# Patient Record
Sex: MALE | Race: WHITE | NOT HISPANIC OR LATINO | Employment: UNEMPLOYED | URBAN - METROPOLITAN AREA
[De-identification: names, ages, dates, MRNs, and addresses within clinical notes are randomized per-mention and may not be internally consistent; named-entity substitution may affect disease eponyms.]

---

## 2023-06-05 ENCOUNTER — TELEPHONE (OUTPATIENT)
Dept: PRIMARY CARE | Facility: CLINIC | Age: 38
End: 2023-06-05
Payer: COMMERCIAL

## 2023-06-05 DIAGNOSIS — G47.26 SHIFT WORK SLEEP DISORDER: ICD-10-CM

## 2023-06-05 RX ORDER — MODAFINIL 100 MG/1
100 TABLET ORAL DAILY
Qty: 90 TABLET | Refills: 0 | Status: SHIPPED | OUTPATIENT
Start: 2023-06-05 | End: 2023-08-29 | Stop reason: SDUPTHER

## 2023-06-05 RX ORDER — MODAFINIL 100 MG/1
100 TABLET ORAL DAILY
COMMUNITY
Start: 2023-05-05 | End: 2023-06-05 | Stop reason: SDUPTHER

## 2023-06-05 NOTE — TELEPHONE ENCOUNTER
Rx Refill Request Telephone Encounter    Name:  Jason Brito  :  [id number]  Medication Name: Modafinil 100 MG Oral Tablet             Specific Pharmacy location:   [address]       Date of last appointment: 22   Date of next appointment: Declined scheduling an appointment at this time.    Best number to reach patient: [phone]

## 2023-08-29 ENCOUNTER — TELEPHONE (OUTPATIENT)
Dept: PRIMARY CARE | Facility: CLINIC | Age: 38
End: 2023-08-29
Payer: COMMERCIAL

## 2023-08-29 DIAGNOSIS — G47.26 SHIFT WORK SLEEP DISORDER: ICD-10-CM

## 2023-08-29 NOTE — TELEPHONE ENCOUNTER
Rx Refill Request Telephone Encounter    Name:  Jason Brito  :  [id number]  Medication Name:  Modafinil  100 mg  oral  daily  90    Specific Pharmacy location:  [facility]  Date of last appointment:  Dec 2022  Date of next appointment:    Best number to reach patient:  [phone]

## 2023-08-31 RX ORDER — MODAFINIL 100 MG/1
100 TABLET ORAL DAILY
Qty: 90 TABLET | Refills: 0 | Status: SHIPPED | OUTPATIENT
Start: 2023-08-31 | End: 2023-09-08 | Stop reason: SDUPTHER

## 2023-09-08 ENCOUNTER — OFFICE VISIT (OUTPATIENT)
Dept: PRIMARY CARE | Facility: CLINIC | Age: 38
End: 2023-09-08
Payer: COMMERCIAL

## 2023-09-08 VITALS
WEIGHT: 161.8 LBS | HEART RATE: 62 BPM | TEMPERATURE: 97.8 F | DIASTOLIC BLOOD PRESSURE: 80 MMHG | OXYGEN SATURATION: 95 % | HEIGHT: 69 IN | SYSTOLIC BLOOD PRESSURE: 115 MMHG | BODY MASS INDEX: 23.96 KG/M2

## 2023-09-08 DIAGNOSIS — G47.26 SHIFT WORK SLEEP DISORDER: ICD-10-CM

## 2023-09-08 DIAGNOSIS — Z79.899 DRUG THERAPY: ICD-10-CM

## 2023-09-08 DIAGNOSIS — Z13.29 THYROID DISORDER SCREENING: ICD-10-CM

## 2023-09-08 DIAGNOSIS — Z13.220 SCREENING, LIPID: Primary | ICD-10-CM

## 2023-09-08 PROCEDURE — 80307 DRUG TEST PRSMV CHEM ANLYZR: CPT

## 2023-09-08 PROCEDURE — 99213 OFFICE O/P EST LOW 20 MIN: CPT | Performed by: NURSE PRACTITIONER

## 2023-09-08 RX ORDER — MODAFINIL 100 MG/1
100 TABLET ORAL DAILY
Qty: 90 TABLET | Refills: 3 | Status: SHIPPED | OUTPATIENT
Start: 2023-09-08 | End: 2024-02-05 | Stop reason: WASHOUT

## 2023-09-08 ASSESSMENT — ENCOUNTER SYMPTOMS
NERVOUS/ANXIOUS: 0
FEVER: 0
PALPITATIONS: 0
VOMITING: 0
DIZZINESS: 0
HEADACHES: 0
APNEA: 0
COUGH: 0
CONSTIPATION: 0
ABDOMINAL PAIN: 0
NAUSEA: 0
CONFUSION: 0
CONSTITUTIONAL NEGATIVE: 1
WHEEZING: 0
ACTIVITY CHANGE: 0
DIARRHEA: 0
CHILLS: 0
RESPIRATORY NEGATIVE: 1
WEAKNESS: 0

## 2023-09-08 NOTE — PROGRESS NOTES
"Subjective   Patient ID: Jason Brito is a 37 y.o. male who presents for Follow-up (6 month follow up ).    Routine visit.  Follow-up shift sleep disorder.  On Provigil  No issues or concerns noted on today         Review of Systems   Constitutional: Negative.  Negative for activity change, chills and fever.   Respiratory: Negative.  Negative for apnea, cough and wheezing.    Cardiovascular:  Negative for chest pain and palpitations.   Gastrointestinal:  Negative for abdominal pain, constipation, diarrhea, nausea and vomiting.   Neurological:  Negative for dizziness, weakness and headaches.   Psychiatric/Behavioral:  Negative for confusion. The patient is not nervous/anxious.        Objective   /80 (BP Location: Left arm, Patient Position: Sitting)   Pulse 62   Temp 36.6 °C (97.8 °F)   Ht 1.753 m (5' 9\")   Wt 73.4 kg (161 lb 12.8 oz)   SpO2 95%   BMI 23.89 kg/m²     Physical Exam  Vitals reviewed.   Constitutional:       Appearance: Normal appearance.   Cardiovascular:      Rate and Rhythm: Normal rate and regular rhythm.      Pulses: Normal pulses.      Heart sounds: Normal heart sounds.   Pulmonary:      Effort: Pulmonary effort is normal.      Breath sounds: Normal breath sounds.   Neurological:      Mental Status: He is alert and oriented to person, place, and time.   Psychiatric:         Mood and Affect: Mood normal.         Behavior: Behavior normal.         Assessment/Plan   Problem List Items Addressed This Visit       Shift work sleep disorder     Med refilled   Uds and contract done.   Doing well         Relevant Medications    modafinil (Provigil) 100 mg tablet    Other Relevant Orders    CBC    Comprehensive Metabolic Panel    Vitamin D 1,25 Dihydroxy (for eval of hypercalcemia)     Other Visit Diagnoses       Screening, lipid    -  Primary    Relevant Orders    Lipid Panel    Thyroid disorder screening        Relevant Orders    TSH with reflex to Free T4 if abnormal    Drug therapy        " Relevant Orders    Drug Screen, Urine With Reflex to Confirmation (Completed)            Time Spent  Time spent directly with patient, family or caregiver: 20 minutes  Other Time Spent: 10 minutes

## 2023-09-09 LAB
AMPHETAMINE (PRESENCE) IN URINE BY SCREEN METHOD: NORMAL
BARBITURATES PRESENCE IN URINE BY SCREEN METHOD: NORMAL
BENZODIAZEPINE (PRESENCE) IN URINE BY SCREEN METHOD: NORMAL
CANNABINOIDS IN URINE BY SCREEN METHOD: NORMAL
COCAINE (PRESENCE) IN URINE BY SCREEN METHOD: NORMAL
DRUG SCREEN COMMENT URINE: NORMAL
FENTANYL URINE: NORMAL
OPIATES (PRESENCE) IN URINE BY SCREEN METHOD: NORMAL
OXYCODONE (PRESENCE) IN URINE BY SCREEN METHOD: NORMAL
PHENCYCLIDINE (PRESENCE) IN URINE BY SCREEN METHOD: NORMAL

## 2023-10-23 ENCOUNTER — HOSPITAL ENCOUNTER (EMERGENCY)
Facility: HOSPITAL | Age: 38
Discharge: HOME | End: 2023-10-23
Payer: COMMERCIAL

## 2023-10-23 ENCOUNTER — APPOINTMENT (OUTPATIENT)
Dept: RADIOLOGY | Facility: HOSPITAL | Age: 38
End: 2023-10-23
Payer: COMMERCIAL

## 2023-10-23 VITALS
DIASTOLIC BLOOD PRESSURE: 70 MMHG | RESPIRATION RATE: 18 BRPM | BODY MASS INDEX: 25.01 KG/M2 | HEIGHT: 68 IN | TEMPERATURE: 98.7 F | OXYGEN SATURATION: 100 % | SYSTOLIC BLOOD PRESSURE: 112 MMHG | WEIGHT: 165 LBS | HEART RATE: 98 BPM

## 2023-10-23 DIAGNOSIS — M77.8 TENDINITIS OF RIGHT TRICEPS: Primary | ICD-10-CM

## 2023-10-23 PROCEDURE — 73080 X-RAY EXAM OF ELBOW: CPT | Mod: RT,FY

## 2023-10-23 PROCEDURE — 73080 X-RAY EXAM OF ELBOW: CPT | Mod: RIGHT SIDE | Performed by: RADIOLOGY

## 2023-10-23 PROCEDURE — 99283 EMERGENCY DEPT VISIT LOW MDM: CPT | Mod: 25

## 2023-10-23 RX ORDER — MELOXICAM 15 MG/1
15 TABLET ORAL DAILY
Qty: 10 TABLET | Refills: 0 | Status: SHIPPED | OUTPATIENT
Start: 2023-10-23 | End: 2024-02-05 | Stop reason: WASHOUT

## 2023-10-23 ASSESSMENT — LIFESTYLE VARIABLES
REASON UNABLE TO ASSESS: NO
EVER HAD A DRINK FIRST THING IN THE MORNING TO STEADY YOUR NERVES TO GET RID OF A HANGOVER: NO
EVER FELT BAD OR GUILTY ABOUT YOUR DRINKING: NO
HAVE PEOPLE ANNOYED YOU BY CRITICIZING YOUR DRINKING: NO
HAVE YOU EVER FELT YOU SHOULD CUT DOWN ON YOUR DRINKING: NO

## 2023-10-23 ASSESSMENT — PAIN DESCRIPTION - LOCATION: LOCATION: ELBOW

## 2023-10-23 ASSESSMENT — PAIN SCALES - GENERAL: PAINLEVEL_OUTOF10: 6

## 2023-10-23 ASSESSMENT — PAIN - FUNCTIONAL ASSESSMENT: PAIN_FUNCTIONAL_ASSESSMENT: 0-10

## 2023-10-23 ASSESSMENT — PAIN DESCRIPTION - PAIN TYPE: TYPE: ACUTE PAIN

## 2023-10-23 ASSESSMENT — PAIN DESCRIPTION - ORIENTATION: ORIENTATION: RIGHT

## 2023-10-23 NOTE — ED PROVIDER NOTES
Chief Complaint   Patient presents with    right elbow pain       HPI       37 year old right hand dominant male presents to the Emergency Department today complaining of a 1 week history of right elbow pain status post injury. Notes that he injured it while catching a limb from a tree. Notes that the pain is mainly in the triceps region and occurs only with certain movements. Denies any associated fever, chills, headache, neck pain, chest pain, shortness of breath, abdominal pain, nausea, vomiting, diarrhea, constipation, or urinary symptoms.       History provided by:  Patient             Patient History   No past medical history on file.  Past Surgical History:   Procedure Laterality Date    OTHER SURGICAL HISTORY  06/18/2020    [place] tooth extraction     No family history on file.  Social History     Tobacco Use    Smoking status: Every Day     Types: Cigarettes    Smokeless tobacco: Never   Vaping Use    Vaping Use: Never used   Substance Use Topics    Alcohol use: Yes     Comment: rarely    Drug use: Never           Physical Exam  Constitutional:       General: He is awake.      Appearance: Normal appearance.   Cardiovascular:      Rate and Rhythm: Normal rate and regular rhythm.      Pulses:           Radial pulses are 3+ on the right side and 3+ on the left side.      Heart sounds: Normal heart sounds. No murmur heard.     No friction rub. No gallop.   Pulmonary:      Effort: Pulmonary effort is normal.      Breath sounds: Normal breath sounds and air entry.   Musculoskeletal:        Arms:       Comments: No obvious deformity or ecchymosis noted to the right elbow region, but there is a small amount of edema and tenderness noted over the triceps tendon. No redness, warmth, erythema, discharge, drainage, or any other signs of infection. Full ROM. Right radial pulse is strong and regular. Capillary refill was within normal limits. Sensation is intact distally.    Neurological:      Mental Status: He is alert.    Psychiatric:         Behavior: Behavior is cooperative.         Labs Reviewed - No data to display    XR elbow right T 3+ views   Final Result   No acute elbow fracture or dislocation.   Negative for elbow joint effusion.   Mild posterior elbow soft tissue swelling and small osteophyte arising   from the olecranon.   Signed by Magnus Da Silva MD               ED Course & MDM   Diagnoses as of 10/23/23 0433   Tendinitis of right triceps           Medical Decision Making  Patient was seen and evaluated by myself. X-rays of the right elbow show osteophytes, but no further pathology. There are no signs of cellulitis or septic joint. Instructed to ice and elevate the sore area as much as possible. Given a prescription for Meloxicam. No contraindications to NSAIDs are noted.  Given orthopedics for follow up. Return if worse in any way. Discharged in stable condition with computer instructions.     Diagnostic Impression:    1. Acute right tendinitis    2. Prescription therapy            Your medication list        ASK your doctor about these medications        Instructions Last Dose Given Next Dose Due   modafinil 100 mg tablet  Commonly known as: Provigil      Take 1 tablet (100 mg) by mouth once daily. as directed                  Procedure  Procedures     YRN Perry-GRAYSON  10/23/23 0524       YRN Perry-GRAYSON  10/23/23 0816

## 2023-12-31 ENCOUNTER — PHARMACY VISIT (OUTPATIENT)
Dept: PHARMACY | Facility: CLINIC | Age: 38
End: 2023-12-31
Payer: MEDICAID

## 2023-12-31 ENCOUNTER — APPOINTMENT (OUTPATIENT)
Dept: RADIOLOGY | Facility: HOSPITAL | Age: 38
End: 2023-12-31
Payer: COMMERCIAL

## 2023-12-31 ENCOUNTER — HOSPITAL ENCOUNTER (EMERGENCY)
Facility: HOSPITAL | Age: 38
Discharge: HOME | End: 2023-12-31
Attending: EMERGENCY MEDICINE
Payer: COMMERCIAL

## 2023-12-31 VITALS
OXYGEN SATURATION: 98 % | HEIGHT: 68 IN | BODY MASS INDEX: 24.25 KG/M2 | DIASTOLIC BLOOD PRESSURE: 82 MMHG | TEMPERATURE: 98.3 F | RESPIRATION RATE: 20 BRPM | HEART RATE: 83 BPM | SYSTOLIC BLOOD PRESSURE: 132 MMHG | WEIGHT: 160 LBS

## 2023-12-31 DIAGNOSIS — R05.1 ACUTE COUGH: Primary | ICD-10-CM

## 2023-12-31 LAB
FLUAV RNA RESP QL NAA+PROBE: NOT DETECTED
FLUBV RNA RESP QL NAA+PROBE: NOT DETECTED
RSV RNA RESP QL NAA+PROBE: NOT DETECTED
SARS-COV-2 RNA RESP QL NAA+PROBE: NOT DETECTED

## 2023-12-31 PROCEDURE — 71045 X-RAY EXAM CHEST 1 VIEW: CPT | Performed by: RADIOLOGY

## 2023-12-31 PROCEDURE — 99283 EMERGENCY DEPT VISIT LOW MDM: CPT | Performed by: EMERGENCY MEDICINE

## 2023-12-31 PROCEDURE — 87637 SARSCOV2&INF A&B&RSV AMP PRB: CPT | Performed by: EMERGENCY MEDICINE

## 2023-12-31 PROCEDURE — RXMED WILLOW AMBULATORY MEDICATION CHARGE

## 2023-12-31 PROCEDURE — 71045 X-RAY EXAM CHEST 1 VIEW: CPT

## 2023-12-31 RX ORDER — ALBUTEROL SULFATE 90 UG/1
2 AEROSOL, METERED RESPIRATORY (INHALATION) EVERY 4 HOURS PRN
Qty: 18 G | Refills: 0 | Status: SHIPPED | OUTPATIENT
Start: 2023-12-31 | End: 2023-12-31 | Stop reason: SDUPTHER

## 2023-12-31 RX ORDER — BENZONATATE 100 MG/1
100 CAPSULE ORAL EVERY 8 HOURS
Qty: 21 CAPSULE | Refills: 0 | Status: SHIPPED | OUTPATIENT
Start: 2023-12-31 | End: 2024-01-07

## 2023-12-31 RX ORDER — ALBUTEROL SULFATE 90 UG/1
2 AEROSOL, METERED RESPIRATORY (INHALATION) EVERY 4 HOURS PRN
Qty: 18 G | Refills: 0 | Status: SHIPPED | OUTPATIENT
Start: 2023-12-31 | End: 2024-02-05 | Stop reason: WASHOUT

## 2023-12-31 RX ORDER — BENZONATATE 100 MG/1
100 CAPSULE ORAL EVERY 8 HOURS
Qty: 21 CAPSULE | Refills: 0 | Status: SHIPPED | OUTPATIENT
Start: 2023-12-31 | End: 2023-12-31 | Stop reason: SDUPTHER

## 2023-12-31 ASSESSMENT — PAIN SCALES - GENERAL: PAINLEVEL_OUTOF10: 1

## 2023-12-31 ASSESSMENT — PAIN - FUNCTIONAL ASSESSMENT: PAIN_FUNCTIONAL_ASSESSMENT: 0-10

## 2023-12-31 NOTE — ED PROVIDER NOTES
HPI   Chief Complaint   Patient presents with   • Cough     X 4 weeks       Patient is a smoker.  Presents for department for weeks of coughing.  Felt often better last week but then got worse again.  Endorses his young son is now coughing as well.  Patient has any sick contacts.  Patient works in a [employer] company.  He denies any known sick contacts.  Patient has any fevers.  Patient states the cough is worse in the morning and associated with phlegm.  Patient has no phlegm for the rest the day and the cough is sometimes better or worse with cannot track any reasons why.  Patient continues to smoke.                          [place] Coma Scale Score: 15                  Patient History   No past medical history on file.  Past Surgical History:   Procedure Laterality Date   • OTHER SURGICAL HISTORY  06/18/2020    [place] tooth extraction     No family history on file.  Social History     Tobacco Use   • Smoking status: Every Day     Types: Cigarettes   • Smokeless tobacco: Never   Vaping Use   • Vaping Use: Never used   Substance Use Topics   • Alcohol use: Yes     Comment: rarely   • Drug use: Never       Physical Exam   ED Triage Vitals [12/31/23 1104]   Temp Heart Rate Resp BP   36.8 °C (98.3 °F) 83 20 132/82      SpO2 Temp src Heart Rate Source Patient Position   98 % -- Monitor Standing      BP Location FiO2 (%)     Left arm --       Physical Exam  Vitals and nursing note reviewed.   Constitutional:       General: He is not in acute distress.     Appearance: He is well-developed.   HENT:      Head: Normocephalic and atraumatic.      Right Ear: External ear normal.      Left Ear: External ear normal.      Mouth/Throat:      Mouth: Mucous membranes are moist.      Pharynx: Oropharynx is clear.   Eyes:      Extraocular Movements: Extraocular movements intact.      Conjunctiva/sclera: Conjunctivae normal.   Neck:      Trachea: Trachea normal.   Cardiovascular:      Rate and Rhythm: Normal rate.    Pulmonary:      Effort: Pulmonary effort is normal. No respiratory distress.      Breath sounds: Normal breath sounds.   Abdominal:      General: Bowel sounds are normal.      Palpations: Abdomen is soft.      Tenderness: There is no abdominal tenderness.   Musculoskeletal:         General: No swelling or tenderness.      Cervical back: No tenderness.   Skin:     General: Skin is warm and dry.      Capillary Refill: Capillary refill takes less than 2 seconds.   Neurological:      General: No focal deficit present.      Mental Status: He is alert and oriented to person, place, and time.   Psychiatric:         Mood and Affect: Mood normal.         Thought Content: Thought content does not include homicidal or suicidal ideation.         ED Course & MDM   Diagnoses as of 12/31/23 1256   Acute cough       Medical Decision Making  Patient presents with cough for 3 weeks.  Available chart reviewed. On initial assessment the patient was found non-toxic, no acute distress, vitals hemodynamically stable and afebrile. Initial concern for viral infection, pneumonia, pneumothorax, bronchitis.  Cough worse in the morning with phlegm seems like postnasal drip.  RSV, COVID, flu testing is all negative. Chest x-ray is read as negative.  Will prescribe albuterol inhaler and give prescription of Tessalon Perles.  Encouraged outpatient follow-up.    No indication for admission.  Discussed findings and diagnosis with the patient, follow-up and return to ED precautions given, patient voiced understanding, agrees with plan, questions answered, patient was discharged in stable condition.        Procedure  Procedures     Parveen Stratton MD  12/31/23 3360

## 2024-01-25 ENCOUNTER — APPOINTMENT (OUTPATIENT)
Dept: PRIMARY CARE | Facility: CLINIC | Age: 39
End: 2024-01-25
Payer: COMMERCIAL

## 2024-02-05 ENCOUNTER — OFFICE VISIT (OUTPATIENT)
Dept: PRIMARY CARE | Facility: CLINIC | Age: 39
End: 2024-02-05
Payer: COMMERCIAL

## 2024-02-05 VITALS
WEIGHT: 174.8 LBS | TEMPERATURE: 98.3 F | SYSTOLIC BLOOD PRESSURE: 132 MMHG | HEART RATE: 85 BPM | RESPIRATION RATE: 18 BRPM | BODY MASS INDEX: 24.47 KG/M2 | HEIGHT: 71 IN | DIASTOLIC BLOOD PRESSURE: 83 MMHG | OXYGEN SATURATION: 94 %

## 2024-02-05 DIAGNOSIS — K04.7 TOOTH ABSCESS: ICD-10-CM

## 2024-02-05 DIAGNOSIS — G47.26 SHIFT WORK SLEEP DISORDER: ICD-10-CM

## 2024-02-05 DIAGNOSIS — R06.83 SNORING: Primary | ICD-10-CM

## 2024-02-05 DIAGNOSIS — R06.81 APNEA FOR GREATER THAN 15 SECONDS: ICD-10-CM

## 2024-02-05 PROBLEM — G89.29 CHRONIC BILATERAL LOW BACK PAIN: Status: ACTIVE | Noted: 2024-02-05

## 2024-02-05 PROBLEM — F19.11 HISTORY OF SUBSTANCE ABUSE (MULTI): Status: ACTIVE | Noted: 2024-02-05

## 2024-02-05 PROBLEM — F39 MOOD DISORDER (CMS-HCC): Status: ACTIVE | Noted: 2019-03-18

## 2024-02-05 PROBLEM — M25.512 CHRONIC LEFT SHOULDER PAIN: Status: ACTIVE | Noted: 2022-11-30

## 2024-02-05 PROBLEM — R10.9 ABDOMINAL PAIN: Status: ACTIVE | Noted: 2022-05-06

## 2024-02-05 PROBLEM — G89.29 CHRONIC PAIN OF BOTH KNEES: Status: ACTIVE | Noted: 2020-11-08

## 2024-02-05 PROBLEM — M54.50 CHRONIC BILATERAL LOW BACK PAIN: Status: ACTIVE | Noted: 2024-02-05

## 2024-02-05 PROBLEM — F17.200 NICOTINE USE DISORDER: Status: ACTIVE | Noted: 2022-05-06

## 2024-02-05 PROBLEM — M25.562 CHRONIC PAIN OF BOTH KNEES: Status: ACTIVE | Noted: 2020-11-08

## 2024-02-05 PROBLEM — M25.561 CHRONIC PAIN OF BOTH KNEES: Status: ACTIVE | Noted: 2020-11-08

## 2024-02-05 PROBLEM — G89.29 CHRONIC LEFT SHOULDER PAIN: Status: ACTIVE | Noted: 2022-11-30

## 2024-02-05 PROBLEM — R10.30 GROIN PAIN: Status: ACTIVE | Noted: 2024-02-05

## 2024-02-05 PROBLEM — R53.83 FATIGUE: Status: ACTIVE | Noted: 2023-02-17

## 2024-02-05 PROBLEM — M17.0 PRIMARY OSTEOARTHRITIS OF BOTH KNEES: Status: ACTIVE | Noted: 2024-02-05

## 2024-02-05 PROBLEM — M77.8 TRICEPS TENDINITIS: Status: ACTIVE | Noted: 2024-02-05

## 2024-02-05 PROBLEM — N45.1 EPIDIDYMITIS: Status: ACTIVE | Noted: 2022-04-22

## 2024-02-05 PROCEDURE — 99214 OFFICE O/P EST MOD 30 MIN: CPT | Performed by: NURSE PRACTITIONER

## 2024-02-05 RX ORDER — AMOXICILLIN AND CLAVULANATE POTASSIUM 500; 125 MG/1; MG/1
500 TABLET, FILM COATED ORAL 3 TIMES DAILY
Qty: 30 TABLET | Refills: 0 | Status: SHIPPED | OUTPATIENT
Start: 2024-02-05 | End: 2024-02-15

## 2024-02-05 ASSESSMENT — ENCOUNTER SYMPTOMS
CHILLS: 0
SHORTNESS OF BREATH: 0
FATIGUE: 0
WHEEZING: 0
PALPITATIONS: 0
WEAKNESS: 0
CONSTIPATION: 0
COUGH: 0
RESPIRATORY NEGATIVE: 1
NAUSEA: 0
APPETITE CHANGE: 0
LIGHT-HEADEDNESS: 0
FEVER: 0
NUMBNESS: 0
ABDOMINAL PAIN: 0
HEADACHES: 1
DIARRHEA: 0
SLEEPING PROBLEMS DURING THE LAST MONTH: 1

## 2024-02-05 NOTE — PROGRESS NOTES
"Subjective   Patient ID: Jason Brito is a 38 y.o. male who presents for Sleeping Problem (Reports \"choking\" in sleep past few years ), Dental Pain (Reports dental pain in upper left side of mouth), and Dental Problem.    Snoring/ Choking in Sleep   Chronic issue   Trialed humidified air, wife using ear plugs to drown out the noise, not working   Was on Provigil , stopped taking due to work shift schedule   Requests referral to sleep center   Denies chest pain, SOB, dizziness   He is a daily smoker       Toothe pain   Upper left toothache   Reports causing intermittent headaches due to the pain   Trialed peroxide , ice, tylenol, not helping   Has not seen a dentist in years   No fevers           Sleeping Problem  Associated symptoms include headaches. Pertinent negatives include no abdominal pain, chest pain, chills, coughing, fatigue, fever, nausea, numbness or weakness.   Dental Pain   Pertinent negatives include no fever.          Review of Systems   Constitutional:  Negative for appetite change, chills, fatigue and fever.   Respiratory: Negative.  Negative for cough, shortness of breath and wheezing.    Cardiovascular:  Negative for chest pain and palpitations.   Gastrointestinal:  Negative for abdominal pain, constipation, diarrhea and nausea.   Neurological:  Positive for headaches. Negative for syncope, weakness, light-headedness and numbness.       Objective   /83   Pulse 85   Temp 36.8 °C (98.3 °F) (Temporal)   Resp 18   Ht 1.803 m (5' 11\")   Wt 79.3 kg (174 lb 12.8 oz)   SpO2 94%   BMI 24.38 kg/m²     Physical Exam  Vitals reviewed.   Constitutional:       Appearance: Normal appearance.   HENT:      Right Ear: Tympanic membrane normal.      Left Ear: Tympanic membrane normal.      Mouth/Throat:      Mouth: Mucous membranes are moist.      Dentition: Dental tenderness, gingival swelling, dental caries and dental abscesses present.      Tongue: No lesions.      Palate: No mass and lesions.      " Pharynx: Uvula midline. No pharyngeal swelling, oropharyngeal exudate, posterior oropharyngeal erythema or uvula swelling.      Tonsils: No tonsillar exudate or tonsillar abscesses.     Cardiovascular:      Rate and Rhythm: Normal rate and regular rhythm.      Pulses: Normal pulses.      Heart sounds: Normal heart sounds. No murmur heard.     No friction rub. No gallop.   Pulmonary:      Effort: Pulmonary effort is normal. No respiratory distress.      Breath sounds: Normal breath sounds. No wheezing or rhonchi.   Chest:      Chest wall: No tenderness.   Abdominal:      General: Bowel sounds are normal.      Palpations: Abdomen is soft.   Skin:     General: Skin is warm and dry.   Neurological:      Mental Status: He is alert and oriented to person, place, and time.         Assessment/Plan   Problem List Items Addressed This Visit             ICD-10-CM    Shift work sleep disorder G47.26     Resolved. On different shift time  Off provigil         Snoring - Primary R06.83     In Center Sleep study ordered   Schedule Appointment   Lab work prior to visit     Go to ED for severe chest pain , SOB , syncope          Relevant Orders    In-Center Sleep Study (Non-Sleep Provider Only)    Apnea for greater than 15 seconds R06.81     PSG ordered /split night  Follow up 1 month   Trial position changes, no etoh, no sleep aids         Relevant Orders    In-Center Sleep Study (Non-Sleep Provider Only)    Tooth abscess K04.7     Take Augmentin 500-125mg tablet as prescribed   Referral to Dentistry   Risks/benefits/side effects discussed         Relevant Medications    amoxicillin-pot clavulanate (Augmentin) 500-125 mg tablet    Other Relevant Orders    Referral to Dentistry

## 2024-02-05 NOTE — ASSESSMENT & PLAN NOTE
Take Augmentin 500-125mg tablet as prescribed   Referral to Dentistry   Risks/benefits/side effects discussed

## 2024-02-05 NOTE — ASSESSMENT & PLAN NOTE
In Center Sleep study ordered   Schedule Appointment   Lab work prior to visit     Go to ED for severe chest pain , SOB , syncope    26-Jul-2019 16:34

## 2024-03-15 ENCOUNTER — CLINICAL SUPPORT (OUTPATIENT)
Dept: SLEEP MEDICINE | Facility: CLINIC | Age: 39
End: 2024-03-15
Payer: COMMERCIAL

## 2024-03-15 DIAGNOSIS — R06.81 APNEA FOR GREATER THAN 15 SECONDS: ICD-10-CM

## 2024-03-15 DIAGNOSIS — R06.83 SNORING: ICD-10-CM

## 2024-03-15 DIAGNOSIS — G47.33 OBSTRUCTIVE SLEEP APNEA (ADULT) (PEDIATRIC): ICD-10-CM

## 2024-03-15 PROCEDURE — 95810 POLYSOM 6/> YRS 4/> PARAM: CPT | Performed by: PSYCHIATRY & NEUROLOGY

## 2024-03-16 VITALS
BODY MASS INDEX: 24.48 KG/M2 | WEIGHT: 174.82 LBS | HEIGHT: 71 IN | SYSTOLIC BLOOD PRESSURE: 132 MMHG | DIASTOLIC BLOOD PRESSURE: 83 MMHG

## 2024-03-16 ASSESSMENT — SLEEP AND FATIGUE QUESTIONNAIRES
SITTING AND RIDING IN A CAR OR BUS FOR ABOUT HALF AN HOUR: 1
SITTING AND EATING A MEAL: 0
SITTING IN A CLASSROOM AT SCHOOL DURING THE MORNING: 0
SITTING AND WATCHING TV OR A VIDEO: 1
LYING DOWN TO REST OR NAP IN THE AFTERNOON: 3
ESS-CHAD TOTAL SCORE: 8
SITTING QUITELY BY YOURSELF AFTER LUNCH: 0
SITTING AND TALKING TO SOMEONE: 0
SITTING AND READING: 3

## 2024-03-16 NOTE — PROGRESS NOTES
[facility] TECH NOTE:     Patient: Jason Brito   MRN//AGE: [id number]  1985  38 y.o.   Technologist: Rafita MARQUES   Room: 1   Service Date: 3/15/2024        Sleep Testing Location: [facility]    [place]:     TECHNOLOGIST SLEEP STUDY PROCEDURE NOTE:   This sleep study is being conducted according to the policies and procedures outlined by the AAS accreditation standards.  The sleep study procedure and processes involved during this appointment was explained to the patient/patient’s family, questions were answered. The patient/family verbalized understanding.      The patient is a 38 y.o. year old male scheduled for a diagnostic PSG  with montage of:  PSG . he arrived for his appointment.      The study that was ultimately completed was a diagnostic PSG  with montage of:  PSG .    The full study Was completed.  Patient questionnaires completed?: yes     Consents signed? yes    Initial Fall Risk Screening:     Jason has not fallen in the last 6 months. his did not result in injury. Jason does not have a fear of falling. He does not need assistance with sitting, standing, or walking. he does not need assistance walking in his home. he does not need assistance in an unfamiliar setting. The patient is notusing an assistive device.     Brief Study observations:  37 y/o male patient presents for a diagnostic sleep study. He arrived to the lab by himself at 8:00 PM. Procedures were explained to the patient and he expressed understanding. He was pleasant and cooperative throughout the procedure. CPAP recall consent form was signed.  Split night protocols were not met due to low AHI.    Deviation to order/protocol and reason: NA      If PAP, which was preferred mask/pressure/mode: NA      Other:None    After the procedure, the patient/family was informed to ensure followup with ordering clinician for testing results.      Technologist: Rafita MARQUES

## 2024-03-21 ENCOUNTER — TELEPHONE (OUTPATIENT)
Dept: PRIMARY CARE | Facility: CLINIC | Age: 39
End: 2024-03-21
Payer: COMMERCIAL

## 2024-03-21 DIAGNOSIS — G47.33 MILD OBSTRUCTIVE SLEEP APNEA: ICD-10-CM

## 2024-03-21 NOTE — TELEPHONE ENCOUNTER
Called and notified patient his sleep study shows mild SEVERINO and PAP therapy is recommended. I told him I would send his CPAP order to [facility] and they will contact him regarding his supplies. Patient is scheduled for 1 month follow up for CPAP compliance

## 2024-09-09 ENCOUNTER — APPOINTMENT (OUTPATIENT)
Dept: PRIMARY CARE | Facility: CLINIC | Age: 39
End: 2024-09-09
Payer: COMMERCIAL

## 2024-09-16 ENCOUNTER — APPOINTMENT (OUTPATIENT)
Dept: PRIMARY CARE | Facility: CLINIC | Age: 39
End: 2024-09-16
Payer: COMMERCIAL

## 2025-03-09 ENCOUNTER — HOSPITAL ENCOUNTER (EMERGENCY)
Facility: HOSPITAL | Age: 40
Discharge: HOME | End: 2025-03-09
Payer: COMMERCIAL

## 2025-03-09 ENCOUNTER — APPOINTMENT (OUTPATIENT)
Dept: RADIOLOGY | Facility: HOSPITAL | Age: 40
End: 2025-03-09
Payer: COMMERCIAL

## 2025-03-09 VITALS
HEART RATE: 74 BPM | OXYGEN SATURATION: 99 % | TEMPERATURE: 98.4 F | HEIGHT: 68 IN | SYSTOLIC BLOOD PRESSURE: 140 MMHG | BODY MASS INDEX: 28.04 KG/M2 | RESPIRATION RATE: 18 BRPM | WEIGHT: 185 LBS | DIASTOLIC BLOOD PRESSURE: 85 MMHG

## 2025-03-09 DIAGNOSIS — S93.602A FOOT SPRAIN, LEFT, INITIAL ENCOUNTER: ICD-10-CM

## 2025-03-09 DIAGNOSIS — S93.402A SPRAIN OF LEFT ANKLE, INITIAL ENCOUNTER: Primary | ICD-10-CM

## 2025-03-09 PROCEDURE — 73610 X-RAY EXAM OF ANKLE: CPT | Mod: LEFT SIDE | Performed by: RADIOLOGY

## 2025-03-09 PROCEDURE — 99284 EMERGENCY DEPT VISIT MOD MDM: CPT

## 2025-03-09 PROCEDURE — 73630 X-RAY EXAM OF FOOT: CPT | Mod: LEFT SIDE | Performed by: RADIOLOGY

## 2025-03-09 PROCEDURE — 73630 X-RAY EXAM OF FOOT: CPT | Mod: LT

## 2025-03-09 PROCEDURE — 73610 X-RAY EXAM OF ANKLE: CPT | Mod: LT

## 2025-03-09 ASSESSMENT — PAIN DESCRIPTION - ORIENTATION: ORIENTATION: LEFT

## 2025-03-09 ASSESSMENT — LIFESTYLE VARIABLES
EVER FELT BAD OR GUILTY ABOUT YOUR DRINKING: NO
TOTAL SCORE: 0
HAVE PEOPLE ANNOYED YOU BY CRITICIZING YOUR DRINKING: NO
EVER HAD A DRINK FIRST THING IN THE MORNING TO STEADY YOUR NERVES TO GET RID OF A HANGOVER: NO
HAVE YOU EVER FELT YOU SHOULD CUT DOWN ON YOUR DRINKING: NO

## 2025-03-09 ASSESSMENT — COLUMBIA-SUICIDE SEVERITY RATING SCALE - C-SSRS
2. HAVE YOU ACTUALLY HAD ANY THOUGHTS OF KILLING YOURSELF?: NO
6. HAVE YOU EVER DONE ANYTHING, STARTED TO DO ANYTHING, OR PREPARED TO DO ANYTHING TO END YOUR LIFE?: NO
1. IN THE PAST MONTH, HAVE YOU WISHED YOU WERE DEAD OR WISHED YOU COULD GO TO SLEEP AND NOT WAKE UP?: NO

## 2025-03-09 ASSESSMENT — PAIN DESCRIPTION - PAIN TYPE: TYPE: ACUTE PAIN

## 2025-03-09 ASSESSMENT — PAIN DESCRIPTION - LOCATION: LOCATION: ANKLE

## 2025-03-09 ASSESSMENT — PAIN - FUNCTIONAL ASSESSMENT: PAIN_FUNCTIONAL_ASSESSMENT: 0-10

## 2025-03-09 ASSESSMENT — PAIN SCALES - GENERAL: PAINLEVEL_OUTOF10: 3

## 2025-03-09 NOTE — ED PROVIDER NOTES
EMERGENCY MEDICINE EVALUATION NOTE    History of Present Illness     Chief Complaint:   Chief Complaint   Patient presents with    Ankle Pain       HPI: Jason Brito is a 39 y.o. male presents with a chief complaint of left foot and ankle pain.  Patient reports that he was walking out to the garage when he just missed the last step.  He states that he stepped and landed on the outside of his left foot causing it to roll inward.  He states that he has had pain along the lateral aspect of the foot as well as into the lateral left ankle since the fall occurred.  Patient denies any other injuries from the incident.  He denies any previous fractures or surgeries to left lower extremity.  Patient did not take anything at home for the pain.  Patient has no medication allergies.    Previous History   History reviewed. No pertinent past medical history.  Past Surgical History:   Procedure Laterality Date    OTHER SURGICAL HISTORY  06/18/2020    [place] tooth extraction     Social History     Tobacco Use    Smoking status: Every Day     Types: Cigarettes    Smokeless tobacco: Never   Vaping Use    Vaping status: Never Used   Substance Use Topics    Alcohol use: Yes     Comment: rarely    Drug use: Never     No family history on file.  No Known Allergies  No current outpatient medications    Physical Exam     Appearance: Alert, oriented , cooperative     Skin: Intact,  dry skin, no lesions, rash, petechiae or purpura.      Eyes: PERRLA, EOMs intact,  Conjunctiva pink      ENT: Hearing grossly intact. Pharynx clear     Neck: Supple. Trachea at midline.      Pulmonary: Clear bilaterally. No rales, rhonchi or wheezing. No accessory muscle use or stridor.     Cardiac: Normal rate and rhythm without murmur     Abdomen: Soft, nontender, active bowel sounds.     Musculoskeletal: Full range of motion.  Intact plantar dorsiflexion of left foot.  Tenderness along lateral malleolus of the left foot as well as along the lateral border of  "the left foot.  Minimal tenderness over proximal fifth metatarsal.  No tenderness of proximal fibula.  Neuro vas intact with strong pulses.     Neurological:Cranial nerves II through XII are grossly intact, normal sensation, no weakness, no focal findings identified.     Results   Labs Reviewed - No data to display  XR ankle left 3+ views   Final Result   No fracture or dislocation.             MACRO:   None        Signed by: Magnus Corral 3/9/2025 7:40 PM   Dictation workstation:   [id number]      XR foot left 3+ views   Final Result   No fracture or dislocation.             MACRO:   None        Signed by: Magnus Corral 3/9/2025 7:40 PM   Dictation workstation:   [id number]            ED Course & Medical Decision Making   Medications - No data to display  Heart Rate:  [65]   Temperature:  [36.9 °C (98.4 °F)]   Respirations:  [18]   BP: (157)/(84)   Height:  [172.7 cm (5' 8\")]   Weight:  [83.9 kg (185 lb)]   Pulse Ox:  [96 %]    ED Course as of 03/09/25 2019   Sun Mar 09, 2025   2018 Updated the patient on the results of his x-rays.  Patient's x-rays did not show any acute fractures or dislocations.  Patient was offered pain medication at home which he declined.  He was encouraged to elevate and ice to help with pain.  Patient encouraged to follow-up with primary care provider in 1 to 2 days or return here immediately with any worsening symptoms.  Patient is agreeable to the plan of care of discharge at this time. [CJ]      ED Course User Index  [CJ] Cirilo Jacome PA-C         Diagnoses as of 03/09/25 2019   Sprain of left ankle, initial encounter   Foot sprain, left, initial encounter       Procedures   Procedures    Diagnosis     1. Sprain of left ankle, initial encounter    2. Foot sprain, left, initial encounter        Disposition   Discharged    ED Prescriptions    None         Disclaimer: This note was dictated by speech recognition. Minor errors in transcription may be present. Please call if questions.     "   Cirilo Jacome PA-C  03/09/25 2019

## 2025-03-09 NOTE — ED TRIAGE NOTES
Pt to ed via pov c/o left ankle pain s/p fall down 1 stair. Pt was carrying box out to garage and went down. Pt hit head on snow shovel, denies blood thinners, denies LOC

## 2025-06-30 ENCOUNTER — APPOINTMENT (OUTPATIENT)
Dept: PRIMARY CARE | Facility: CLINIC | Age: 40
End: 2025-06-30
Payer: COMMERCIAL

## 2025-06-30 VITALS
DIASTOLIC BLOOD PRESSURE: 108 MMHG | HEART RATE: 75 BPM | TEMPERATURE: 97 F | OXYGEN SATURATION: 95 % | WEIGHT: 183.7 LBS | BODY MASS INDEX: 27.93 KG/M2 | SYSTOLIC BLOOD PRESSURE: 116 MMHG

## 2025-06-30 DIAGNOSIS — Z13.220 SCREENING, LIPID: ICD-10-CM

## 2025-06-30 DIAGNOSIS — R42 DIZZINESS: ICD-10-CM

## 2025-06-30 DIAGNOSIS — L84 HELOMA DURUM: ICD-10-CM

## 2025-06-30 DIAGNOSIS — R41.0 CONFUSION: Primary | ICD-10-CM

## 2025-06-30 DIAGNOSIS — R53.82 CHRONIC FATIGUE: ICD-10-CM

## 2025-06-30 DIAGNOSIS — R74.8 ELEVATED LIVER ENZYMES: ICD-10-CM

## 2025-06-30 DIAGNOSIS — Z13.1 DIABETES MELLITUS SCREENING: ICD-10-CM

## 2025-06-30 DIAGNOSIS — R05.8 ALLERGIC COUGH: ICD-10-CM

## 2025-06-30 PROCEDURE — 99417 PROLNG OP E/M EACH 15 MIN: CPT | Performed by: NURSE PRACTITIONER

## 2025-06-30 PROCEDURE — 99215 OFFICE O/P EST HI 40 MIN: CPT | Performed by: NURSE PRACTITIONER

## 2025-06-30 RX ORDER — LORATADINE 10 MG/1
10 TABLET ORAL DAILY
Qty: 30 TABLET | Refills: 2 | Status: SHIPPED | OUTPATIENT
Start: 2025-06-30 | End: 2025-09-28

## 2025-06-30 RX ORDER — BENZONATATE 100 MG/1
100 CAPSULE ORAL 3 TIMES DAILY PRN
Qty: 42 CAPSULE | Refills: 1 | Status: SHIPPED | OUTPATIENT
Start: 2025-06-30

## 2025-06-30 ASSESSMENT — ENCOUNTER SYMPTOMS
NAUSEA: 0
WEAKNESS: 0
HEADACHES: 0
CONSTITUTIONAL NEGATIVE: 1
CHILLS: 0
COUGH: 1
FEVER: 0
SEIZURES: 0
SPEECH DIFFICULTY: 0
ACTIVITY CHANGE: 0
PALPITATIONS: 0
NUMBNESS: 0
NERVOUS/ANXIOUS: 0
SHORTNESS OF BREATH: 0
APNEA: 0
VOMITING: 0
CONFUSION: 0
FATIGUE: 0
ABDOMINAL PAIN: 0
DIZZINESS: 0

## 2025-06-30 NOTE — PROGRESS NOTES
Subjective   Patient ID: Jason Brito is a 39 y.o. male who presents for Cough, ER Follow-up, and Foot pain and [place].    ER visit follow up.  ER follow-up from 5/29/2025.  Seen for chest wall pain that wrapped around his back. He reports it last for 10 min. Patient is a . He was able to drive to a local hospital. Unremarkable chest xray, d dimer, EKG and troponins. Elevated lft's    He mentions today that he felt confused afterwards. Some dizziness. He was able to drive himself to the hospital   He tried to walk up to a plant outside of the hospital thinking it was the door. This is concerning today, not sure if he mentioned to hospital staff    Cough: Dry cough but occasionally some phlegm. Patient is a daily cigarette, a little more than 0.5 ppd   Has not tried anything for the cough otc.   Denies sore throat, ear pain, body aches, fever chills   Denies wheezing and SOB   Left foot/ heel with hard callus appearing area. Afraid he may have gotten wood fragments in foot while working. No redness, drainage         Review of Systems   Constitutional: Negative.  Negative for activity change, chills, fatigue and fever.   Respiratory:  Positive for cough. Negative for apnea and shortness of breath.    Cardiovascular:  Negative for chest pain and palpitations.   Gastrointestinal:  Negative for abdominal pain, nausea and vomiting.   Neurological:  Negative for dizziness, seizures, syncope, speech difficulty, weakness, numbness and headaches.   Psychiatric/Behavioral:  Negative for confusion. The patient is not nervous/anxious.        Objective   BP (!) 116/108   Pulse 75   Temp 36.1 °C (97 °F) (Temporal)   Wt 83.3 kg (183 lb 11.2 oz)   SpO2 95%   BMI 27.93 kg/m²     Physical Exam  Vitals reviewed.   Constitutional:       Appearance: Normal appearance.   HENT:      Head: Normocephalic.   Cardiovascular:      Rate and Rhythm: Normal rate and regular rhythm.      Pulses: Normal pulses.      Heart  sounds: Normal heart sounds.   Pulmonary:      Effort: Pulmonary effort is normal. No respiratory distress.      Breath sounds: Normal breath sounds. No wheezing.   Abdominal:      General: Bowel sounds are normal.   Neurological:      General: No focal deficit present.      Mental Status: He is alert and oriented to person, place, and time.   Psychiatric:         Mood and Affect: Mood normal.         Behavior: Behavior normal.         Assessment/Plan   Problem List Items Addressed This Visit           ICD-10-CM    Fatigue R53.83    Testosterone, tsh, metabolic panel ordered            Relevant Orders    CBC and Auto Differential    Comprehensive Metabolic Panel    TSH with reflex to Free T4 if abnormal    MR brain wo IV contrast    Testosterone,Free and Total    [place] durum L84    Referral to podiatry for eval         Relevant Orders    Referral to Podiatry    Diabetes mellitus screening Z13.1    Relevant Orders    Hemoglobin A1C    Elevated liver enzymes R74.8    Relevant Orders    Comprehensive Metabolic Panel    Confusion - Primary R41.0    MRI brain for eval   Labs ordered          Relevant Orders    MR brain wo IV contrast    Allergic cough R05.8    Tessalon for cough/   Trial antihistamine           Relevant Medications    loratadine (Claritin) 10 mg tablet    benzonatate (Tessalon) 100 mg capsule    Dizziness R42    MRI brain for eval   Follow up 1-2 weeks   Increase po fluid intake   Labs ordered         Relevant Orders    MR brain wo IV contrast     Other Visit Diagnoses         Codes      Screening, lipid     Z13.220    Relevant Orders    Lipid Panel

## 2025-07-06 LAB
ALBUMIN SERPL-MCNC: 4.7 G/DL (ref 3.6–5.1)
ALP SERPL-CCNC: 62 U/L (ref 36–130)
ALT SERPL-CCNC: 114 U/L (ref 9–46)
ANION GAP SERPL CALCULATED.4IONS-SCNC: 8 MMOL/L (CALC) (ref 7–17)
AST SERPL-CCNC: 58 U/L (ref 10–40)
BASOPHILS # BLD AUTO: 91 CELLS/UL (ref 0–200)
BASOPHILS NFR BLD AUTO: 1.1 %
BILIRUB SERPL-MCNC: 0.3 MG/DL (ref 0.2–1.2)
BUN SERPL-MCNC: 19 MG/DL (ref 7–25)
CALCIUM SERPL-MCNC: 9.8 MG/DL (ref 8.6–10.3)
CHLORIDE SERPL-SCNC: 103 MMOL/L (ref 98–110)
CHOLEST SERPL-MCNC: 214 MG/DL
CHOLEST/HDLC SERPL: 4.8 (CALC)
CO2 SERPL-SCNC: 27 MMOL/L (ref 20–32)
CREAT SERPL-MCNC: 1.27 MG/DL (ref 0.6–1.29)
EGFRCR SERPLBLD CKD-EPI 2021: 73 ML/MIN/1.73M2
EOSINOPHIL # BLD AUTO: 274 CELLS/UL (ref 15–500)
EOSINOPHIL NFR BLD AUTO: 3.3 %
ERYTHROCYTE [DISTWIDTH] IN BLOOD BY AUTOMATED COUNT: 14.9 % (ref 11–15)
EST. AVERAGE GLUCOSE BLD GHB EST-MCNC: 111 MG/DL
EST. AVERAGE GLUCOSE BLD GHB EST-SCNC: 6.2 MMOL/L
GLUCOSE SERPL-MCNC: 90 MG/DL (ref 65–139)
HBA1C MFR BLD: 5.5 %
HCT VFR BLD AUTO: 51.5 % (ref 38.5–50)
HDLC SERPL-MCNC: 45 MG/DL
HGB BLD-MCNC: 17.4 G/DL (ref 13.2–17.1)
LDLC SERPL CALC-MCNC: 122 MG/DL (CALC)
LYMPHOCYTES # BLD AUTO: 2216 CELLS/UL (ref 850–3900)
LYMPHOCYTES NFR BLD AUTO: 26.7 %
MCH RBC QN AUTO: 32.3 PG (ref 27–33)
MCHC RBC AUTO-ENTMCNC: 33.8 G/DL (ref 32–36)
MCV RBC AUTO: 95.7 FL (ref 80–100)
MONOCYTES # BLD AUTO: 631 CELLS/UL (ref 200–950)
MONOCYTES NFR BLD AUTO: 7.6 %
NEUTROPHILS # BLD AUTO: 5088 CELLS/UL (ref 1500–7800)
NEUTROPHILS NFR BLD AUTO: 61.3 %
NONHDLC SERPL-MCNC: 169 MG/DL (CALC)
PLATELET # BLD AUTO: 257 THOUSAND/UL (ref 140–400)
PMV BLD REES-ECKER: 9.6 FL (ref 7.5–12.5)
POTASSIUM SERPL-SCNC: 4.4 MMOL/L (ref 3.5–5.3)
PROT SERPL-MCNC: 7.2 G/DL (ref 6.1–8.1)
RBC # BLD AUTO: 5.38 MILLION/UL (ref 4.2–5.8)
SODIUM SERPL-SCNC: 138 MMOL/L (ref 135–146)
TESTOST FREE SERPL-MCNC: 34.2 PG/ML (ref 35–155)
TESTOST SERPL-MCNC: 303 NG/DL (ref 250–1100)
TRIGL SERPL-MCNC: 329 MG/DL
TSH SERPL-ACNC: 1.12 MIU/L (ref 0.4–4.5)
WBC # BLD AUTO: 8.3 THOUSAND/UL (ref 3.8–10.8)

## 2025-07-14 ENCOUNTER — APPOINTMENT (OUTPATIENT)
Dept: RADIOLOGY | Facility: CLINIC | Age: 40
End: 2025-07-14
Payer: COMMERCIAL

## 2025-07-14 ENCOUNTER — APPOINTMENT (OUTPATIENT)
Dept: PRIMARY CARE | Facility: CLINIC | Age: 40
End: 2025-07-14
Payer: COMMERCIAL

## 2025-07-22 ENCOUNTER — APPOINTMENT (OUTPATIENT)
Dept: RADIOLOGY | Facility: CLINIC | Age: 40
End: 2025-07-22
Payer: COMMERCIAL

## 2025-07-25 ENCOUNTER — APPOINTMENT (OUTPATIENT)
Dept: PRIMARY CARE | Facility: CLINIC | Age: 40
End: 2025-07-25
Payer: COMMERCIAL

## 2025-08-01 ENCOUNTER — APPOINTMENT (OUTPATIENT)
Dept: PRIMARY CARE | Facility: CLINIC | Age: 40
End: 2025-08-01
Payer: COMMERCIAL